# Patient Record
Sex: MALE | Race: WHITE | NOT HISPANIC OR LATINO | URBAN - METROPOLITAN AREA
[De-identification: names, ages, dates, MRNs, and addresses within clinical notes are randomized per-mention and may not be internally consistent; named-entity substitution may affect disease eponyms.]

---

## 2022-09-30 ENCOUNTER — OFFICE (OUTPATIENT)
Dept: URBAN - METROPOLITAN AREA CLINIC 11 | Facility: CLINIC | Age: 82
End: 2022-09-30

## 2022-09-30 VITALS
DIASTOLIC BLOOD PRESSURE: 105 MMHG | OXYGEN SATURATION: 96 % | SYSTOLIC BLOOD PRESSURE: 160 MMHG | HEIGHT: 71 IN | HEART RATE: 68 BPM | WEIGHT: 198 LBS

## 2022-09-30 DIAGNOSIS — K59.00 CONSTIPATION, UNSPECIFIED: ICD-10-CM

## 2022-09-30 PROCEDURE — 99203 OFFICE O/P NEW LOW 30 MIN: CPT | Performed by: STUDENT IN AN ORGANIZED HEALTH CARE EDUCATION/TRAINING PROGRAM

## 2022-09-30 NOTE — SERVICENOTES
After discussion with the patient including the risks and benefits of future colon cancer screening a joint decision was made to not pursue any future colonoscopy due to the patient's age and comorbidities.  He has had 2 prior high-quality colonoscopies so the risk of developing colon cancer would be very low.  I encouraged him to follow up as needed and if he develops any symptoms of unexplained weight loss, anemia, rectal bleeding or change in bowel habits then he should come back to see us and we can certainly evaluate him for colonoscopy.  Patient is in complete agreement with this and is aware of the risks and benefits.  All questions addressed.  I encouraged him to continue a high-fiber diet and daily fiber supplementation to assist with intermittent constipation.

## 2022-09-30 NOTE — SERVICEHPINOTES
Mr. Augustus Miguel is an  82-year-old white male with past medical history of COPD, arthritis, who presents to GI clinic as a referral for colon cancer screening evaluation.  Patient reports that overall he feels well.  He has some occasional constipation for which he has to strain but this is very rare and depends on what he eats.  He is currently not on any stool softeners or high fiber supplements.  He endorses no blood in his stool or black tarry stool.  He endorses no rapid weight loss or weight gain.  He has 2 prior high-quality colonoscopy is with the most recent done in 2012, good prep, 1 hyperplastic polyp, moderate internal hemorrhoids, moderate diverticulosis seen.  He was re-evaluated in 2015 by Dr. Mcclelland for Hemoccult-positive but on repeat he was Hemoccult negative so no further colonoscopy was recommended unless symptoms arose.  Patient does not take any NSAIDs except for diclofenac for right knee pain, has no dysphagia or nausea vomiting.  No prior abdominal surgeries, no family history of colorectal cancer or GI malignancies.